# Patient Record
Sex: MALE | Race: WHITE | NOT HISPANIC OR LATINO | Employment: FULL TIME | URBAN - METROPOLITAN AREA
[De-identification: names, ages, dates, MRNs, and addresses within clinical notes are randomized per-mention and may not be internally consistent; named-entity substitution may affect disease eponyms.]

---

## 2023-07-12 ENCOUNTER — OFFICE VISIT (OUTPATIENT)
Dept: PRIMARY CARE | Facility: CLINIC | Age: 28
End: 2023-07-12

## 2023-07-12 VITALS — RESPIRATION RATE: 12 BRPM | HEART RATE: 72 BPM

## 2023-07-12 DIAGNOSIS — R10.32 LLQ ABDOMINAL PAIN: ICD-10-CM

## 2023-07-12 DIAGNOSIS — N45.1 EPIDIDYMITIS: ICD-10-CM

## 2023-07-12 DIAGNOSIS — Z00.00 HEALTH CARE MAINTENANCE: Primary | ICD-10-CM

## 2023-07-12 PROCEDURE — 99213 OFFICE O/P EST LOW 20 MIN: CPT | Performed by: INTERNAL MEDICINE

## 2023-07-12 RX ORDER — DOXYCYCLINE 100 MG/1
100 CAPSULE ORAL 2 TIMES DAILY
Qty: 14 CAPSULE | Refills: 0 | Status: SHIPPED | OUTPATIENT
Start: 2023-07-12 | End: 2023-07-19

## 2023-07-12 NOTE — PROGRESS NOTES
Subjective   Patient ID: Brad Sterling is a 28 y.o. male who presents for No chief complaint on file..    HPI sick visit and follow-up no chest pain no shortness of breath no abdominal pain no flank pain but has had left testicular discomfort within the past week more so than he has had in the past no recent injury does cycle and work and sometimes there is some minor traumas there but nothing overt has not had a hernia in the past no dysuria bowels normal no fever    Review of Systems    Objective   There were no vitals taken for this visit.    Physical Exam vital signs noted alert and oriented x3 NCAT no JVD or bruit chest clear to auscultation and percussion CV regular rate and rhythm S1-S2 without murmur gallop or rub extremities no clubbing cyanosis or edema normal distal pulses abdomen soft nontender normal active bowel sounds  normal penis no skin markings normal descended testicles bilaterally swelling of the left posterior area consistent with epididymo seal no inguinal hernia no lymphadenopathy    Assessment/Plan impression left lower quadrant abdominal discomfort epididymitis other diagnoses  Plan expecting first child in several months okay for doxycycline 100 mg p.o. twice daily #14 refill 0 good water consumption okay for Aleve 1 tablet twice daily for 3 days he prefers not to take much in the way of analgesic medication recheck or urology if no better heating pad over the abdominal area stool softener as needed recheck based on above

## 2023-07-14 ENCOUNTER — APPOINTMENT (OUTPATIENT)
Dept: PRIMARY CARE | Facility: CLINIC | Age: 28
End: 2023-07-14

## 2024-09-24 ENCOUNTER — TELEPHONE (OUTPATIENT)
Dept: PRIMARY CARE | Facility: CLINIC | Age: 29
End: 2024-09-24

## 2024-09-25 DIAGNOSIS — Z00.00 HEALTH CARE MAINTENANCE: Primary | ICD-10-CM
